# Patient Record
Sex: MALE | Race: WHITE | NOT HISPANIC OR LATINO | Employment: UNEMPLOYED | URBAN - METROPOLITAN AREA
[De-identification: names, ages, dates, MRNs, and addresses within clinical notes are randomized per-mention and may not be internally consistent; named-entity substitution may affect disease eponyms.]

---

## 2024-11-18 ENCOUNTER — OFFICE VISIT (OUTPATIENT)
Dept: URGENT CARE | Facility: CLINIC | Age: 44
End: 2024-11-18

## 2024-11-18 VITALS
DIASTOLIC BLOOD PRESSURE: 84 MMHG | SYSTOLIC BLOOD PRESSURE: 140 MMHG | WEIGHT: 240 LBS | HEIGHT: 72 IN | BODY MASS INDEX: 32.51 KG/M2 | RESPIRATION RATE: 18 BRPM | HEART RATE: 102 BPM | TEMPERATURE: 98 F

## 2024-11-18 DIAGNOSIS — S50.02XA CONTUSION OF LEFT ELBOW, INITIAL ENCOUNTER: ICD-10-CM

## 2024-11-18 DIAGNOSIS — T14.8XXA ABRASION: ICD-10-CM

## 2024-11-18 DIAGNOSIS — S80.02XA CONTUSION OF LEFT KNEE, INITIAL ENCOUNTER: ICD-10-CM

## 2024-11-18 DIAGNOSIS — M25.522 LEFT ELBOW PAIN: Primary | ICD-10-CM

## 2024-11-18 PROCEDURE — 99203 OFFICE O/P NEW LOW 30 MIN: CPT | Mod: TIER,S$GLB,, | Performed by: NURSE PRACTITIONER

## 2024-11-18 NOTE — PROGRESS NOTES
Subjective:       Patient ID: Des Gonzalez is a 44 y.o. male.    Vitals:  height is 6' (1.829 m) and weight is 108.9 kg (240 lb). His oral temperature is 97.9 °F (36.6 °C). His blood pressure is 140/84 (abnormal) and his pulse is 102. His respiration is 18.     Chief Complaint: Elbow Injury    44 y.o. male who presents today with a chief complaint of left elbow pain. He explains that he tripped and fell (ground level fall) last night at 9pm. He also reports a small abrasion to left eyebrow area and anterior left knee. He denies any LOC. He denies any neck pain. He denies any other symptoms or complaints other than previously stated.    Elbow Injury  This is a new problem. The current episode started yesterday. The problem occurs constantly. The problem has been gradually worsening. Exacerbated by: movement. He has tried nothing for the symptoms.       Musculoskeletal:  Positive for pain.   Skin:  Positive for abrasion.           Objective:      Physical Exam   Constitutional: He is oriented to person, place, and time.  Non-toxic appearance. He does not appear ill. No distress. obesity  HENT:   Head: Normocephalic.      Comments: Small abrasion to left eyebrow area.  Ears:   Right Ear: Tympanic membrane, external ear and ear canal normal.   Left Ear: Tympanic membrane, external ear and ear canal normal.   Nose: Nose normal.   Mouth/Throat: Mucous membranes are moist. Oropharynx is clear.   Eyes: Conjunctivae are normal. Pupils are equal, round, and reactive to light. Extraocular movement intact   Neck: Neck supple. No neck rigidity present.   Cardiovascular: Normal rate, regular rhythm, normal heart sounds and normal pulses.   Pulmonary/Chest: Effort normal and breath sounds normal.   Abdominal: Normal appearance.   Musculoskeletal: Normal range of motion.         General: No swelling or tenderness. Normal range of motion.      Cervical back: He exhibits no tenderness.      Comments: + Pain to left elbow with  flexion/extension.   Neurological: no focal deficit. He is alert and oriented to person, place, and time. He displays no weakness. No sensory deficit. Gait normal.   Skin: Skin is warm, dry and not diaphoretic.         Comments: Small abrasion to anterior left knee.   Psychiatric: His behavior is normal. Mood, judgment and thought content normal.   Vitals reviewed.        Past medical history and current medications reviewed.     No results found for this or any previous visit. XR ELBOW 2 VIEWS LEFT    Result Date: 11/18/2024  EXAMINATION: XR ELBOW 2 VIEWS LEFT CLINICAL HISTORY: Pain in left elbow TECHNIQUE: AP lateral views left elbow. COMPARISON: None FINDINGS: There is a small joint effusion with fullness of the anterior fat pad and partial visualization of the posterior fat pad.  This is suspicious for an occult fracture of the radial head.  No linear lucency identified within the radial head to suggest fracture location. Joint spaces are preserved.  Proximal ulna and distal humerus intact     Small joint effusion suspicious for an occult fracture of the radial head.  Repeat imaging in 2-3 days as deemed clinically necessary to confirm this finding. This report was flagged in Epic as abnormal. Electronically signed by: Erasmo Schmid Date:    11/18/2024 Time:    10:19     ORTHOGLASS SPLINT/SHOULDER SLING  Posterior Elbow splint placed to the left arm. Patient had good TNV before and after application. A shoulder sling was also placed. Patient was given sling/splint instructions. He verbalized understanding of same.  Assessment:           1. Left elbow pain    2. Contusion of left elbow, initial encounter    3. Abrasion    4. Contusion of left knee, initial encounter              Plan:         Left elbow pain  -     XR ELBOW 2 VIEWS LEFT; Future; Expected date: 11/18/2024    Contusion of left elbow, initial encounter  -     Ambulatory referral/consult to Orthopedics    Abrasion  -     Ambulatory referral/consult  to Orthopedics    Contusion of left knee, initial encounter           INSTRUCTIONS  Rest, Ice, Elevate. Ibuprofen for pain. Follow up with orthopedics as scheduled.

## 2025-03-13 ENCOUNTER — OFFICE VISIT (OUTPATIENT)
Dept: URGENT CARE | Facility: CLINIC | Age: 45
End: 2025-03-13
Payer: COMMERCIAL

## 2025-03-13 ENCOUNTER — HOSPITAL ENCOUNTER (EMERGENCY)
Facility: HOSPITAL | Age: 45
Discharge: ANOTHER HOSPITAL | End: 2025-03-14
Attending: EMERGENCY MEDICINE
Payer: COMMERCIAL

## 2025-03-13 VITALS
HEIGHT: 72 IN | SYSTOLIC BLOOD PRESSURE: 131 MMHG | OXYGEN SATURATION: 99 % | TEMPERATURE: 98 F | DIASTOLIC BLOOD PRESSURE: 92 MMHG | RESPIRATION RATE: 19 BRPM | WEIGHT: 240 LBS | BODY MASS INDEX: 32.51 KG/M2 | HEART RATE: 57 BPM

## 2025-03-13 DIAGNOSIS — R07.9 CHEST PAIN, UNSPECIFIED TYPE: Primary | ICD-10-CM

## 2025-03-13 DIAGNOSIS — I21.4 NSTEMI (NON-ST ELEVATED MYOCARDIAL INFARCTION): Primary | ICD-10-CM

## 2025-03-13 DIAGNOSIS — R61 DIAPHORESIS: ICD-10-CM

## 2025-03-13 DIAGNOSIS — R09.81 NASAL CONGESTION: ICD-10-CM

## 2025-03-13 DIAGNOSIS — R07.9 CHEST PAIN: ICD-10-CM

## 2025-03-13 DIAGNOSIS — R05.9 COUGH, UNSPECIFIED TYPE: ICD-10-CM

## 2025-03-13 LAB
ALBUMIN SERPL BCP-MCNC: 4 G/DL (ref 3.5–5.2)
ALP SERPL-CCNC: 71 U/L (ref 40–150)
ALT SERPL W/O P-5'-P-CCNC: 58 U/L (ref 10–44)
ANION GAP SERPL CALC-SCNC: 12 MMOL/L (ref 8–16)
APTT PPP: 27 SEC (ref 21–32)
AST SERPL-CCNC: 59 U/L (ref 10–40)
BACTERIA #/AREA URNS HPF: ABNORMAL /HPF
BASOPHILS # BLD AUTO: 0.03 K/UL (ref 0–0.2)
BASOPHILS NFR BLD: 0.3 % (ref 0–1.9)
BILIRUB SERPL-MCNC: 1.8 MG/DL (ref 0.1–1)
BILIRUB UR QL STRIP: ABNORMAL
BNP SERPL-MCNC: 21 PG/ML (ref 0–99)
BUN SERPL-MCNC: 12 MG/DL (ref 6–20)
CALCIUM SERPL-MCNC: 9.4 MG/DL (ref 8.7–10.5)
CHLORIDE SERPL-SCNC: 97 MMOL/L (ref 95–110)
CLARITY UR: ABNORMAL
CO2 SERPL-SCNC: 25 MMOL/L (ref 23–29)
COLOR UR: YELLOW
CREAT SERPL-MCNC: 1 MG/DL (ref 0.5–1.4)
CTP QC/QA: YES
CTP QC/QA: YES
DIFFERENTIAL METHOD BLD: ABNORMAL
EOSINOPHIL # BLD AUTO: 0.1 K/UL (ref 0–0.5)
EOSINOPHIL NFR BLD: 0.7 % (ref 0–8)
ERYTHROCYTE [DISTWIDTH] IN BLOOD BY AUTOMATED COUNT: 12.5 % (ref 11.5–14.5)
EST. GFR  (NO RACE VARIABLE): >60 ML/MIN/1.73 M^2
GLUCOSE SERPL-MCNC: 108 MG/DL (ref 70–110)
GLUCOSE UR QL STRIP: NEGATIVE
HCT VFR BLD AUTO: 47.7 % (ref 40–54)
HGB BLD-MCNC: 17.2 G/DL (ref 14–18)
HGB UR QL STRIP: NEGATIVE
HYALINE CASTS #/AREA URNS LPF: 0 /LPF
IMM GRANULOCYTES # BLD AUTO: 0.03 K/UL (ref 0–0.04)
IMM GRANULOCYTES NFR BLD AUTO: 0.3 % (ref 0–0.5)
INR PPP: 1 (ref 0.8–1.2)
INR PPP: 1.1 (ref 0.8–1.2)
INR PPP: 1.1 (ref 0.8–1.2)
KETONES UR QL STRIP: ABNORMAL
LEUKOCYTE ESTERASE UR QL STRIP: NEGATIVE
LYMPHOCYTES # BLD AUTO: 1.4 K/UL (ref 1–4.8)
LYMPHOCYTES NFR BLD: 14.4 % (ref 18–48)
MCH RBC QN AUTO: 30.7 PG (ref 27–31)
MCHC RBC AUTO-ENTMCNC: 36.1 G/DL (ref 32–36)
MCV RBC AUTO: 85 FL (ref 82–98)
MICROSCOPIC COMMENT: ABNORMAL
MONOCYTES # BLD AUTO: 1.7 K/UL (ref 0.3–1)
MONOCYTES NFR BLD: 18.4 % (ref 4–15)
NEUTROPHILS # BLD AUTO: 6.2 K/UL (ref 1.8–7.7)
NEUTROPHILS NFR BLD: 65.9 % (ref 38–73)
NITRITE UR QL STRIP: NEGATIVE
NRBC BLD-RTO: 0 /100 WBC
PH UR STRIP: 6 [PH] (ref 5–8)
PLATELET # BLD AUTO: 121 K/UL (ref 150–450)
PMV BLD AUTO: 10.9 FL (ref 9.2–12.9)
POC MOLECULAR INFLUENZA A AGN: NEGATIVE
POC MOLECULAR INFLUENZA B AGN: NEGATIVE
POTASSIUM SERPL-SCNC: 3.3 MMOL/L (ref 3.5–5.1)
PROT SERPL-MCNC: 8.2 G/DL (ref 6–8.4)
PROT UR QL STRIP: ABNORMAL
PROTHROMBIN TIME: 11.5 SEC (ref 9–12.5)
PROTHROMBIN TIME: 11.7 SEC (ref 9–12.5)
PROTHROMBIN TIME: 11.7 SEC (ref 9–12.5)
RBC # BLD AUTO: 5.61 M/UL (ref 4.6–6.2)
RBC #/AREA URNS HPF: 1 /HPF (ref 0–4)
SARS CORONAVIRUS 2 ANTIGEN: NEGATIVE
SODIUM SERPL-SCNC: 134 MMOL/L (ref 136–145)
SP GR UR STRIP: 1.02 (ref 1–1.03)
TROPONIN I SERPL DL<=0.01 NG/ML-MCNC: 5.15 NG/ML (ref 0–0.03)
TROPONIN I SERPL DL<=0.01 NG/ML-MCNC: 7.98 NG/ML (ref 0–0.03)
URN SPEC COLLECT METH UR: ABNORMAL
UROBILINOGEN UR STRIP-ACNC: NEGATIVE EU/DL
WBC # BLD AUTO: 9.39 K/UL (ref 3.9–12.7)
WBC #/AREA URNS HPF: 3 /HPF (ref 0–5)

## 2025-03-13 PROCEDURE — 84484 ASSAY OF TROPONIN QUANT: CPT | Mod: 91 | Performed by: EMERGENCY MEDICINE

## 2025-03-13 PROCEDURE — 83880 ASSAY OF NATRIURETIC PEPTIDE: CPT | Performed by: EMERGENCY MEDICINE

## 2025-03-13 PROCEDURE — 85730 THROMBOPLASTIN TIME PARTIAL: CPT | Performed by: EMERGENCY MEDICINE

## 2025-03-13 PROCEDURE — 85610 PROTHROMBIN TIME: CPT | Performed by: EMERGENCY MEDICINE

## 2025-03-13 PROCEDURE — 25000242 PHARM REV CODE 250 ALT 637 W/ HCPCS: Performed by: EMERGENCY MEDICINE

## 2025-03-13 PROCEDURE — 93010 ELECTROCARDIOGRAM REPORT: CPT | Mod: ,,, | Performed by: INTERNAL MEDICINE

## 2025-03-13 PROCEDURE — 87389 HIV-1 AG W/HIV-1&-2 AB AG IA: CPT | Performed by: EMERGENCY MEDICINE

## 2025-03-13 PROCEDURE — 96374 THER/PROPH/DIAG INJ IV PUSH: CPT

## 2025-03-13 PROCEDURE — 81000 URINALYSIS NONAUTO W/SCOPE: CPT | Performed by: EMERGENCY MEDICINE

## 2025-03-13 PROCEDURE — 71045 X-RAY EXAM CHEST 1 VIEW: CPT | Mod: TC

## 2025-03-13 PROCEDURE — 93005 ELECTROCARDIOGRAM TRACING: CPT | Performed by: INTERNAL MEDICINE

## 2025-03-13 PROCEDURE — 63600175 PHARM REV CODE 636 W HCPCS: Performed by: EMERGENCY MEDICINE

## 2025-03-13 PROCEDURE — 85025 COMPLETE CBC W/AUTO DIFF WBC: CPT | Performed by: EMERGENCY MEDICINE

## 2025-03-13 PROCEDURE — 86803 HEPATITIS C AB TEST: CPT | Performed by: EMERGENCY MEDICINE

## 2025-03-13 PROCEDURE — 25000003 PHARM REV CODE 250: Performed by: EMERGENCY MEDICINE

## 2025-03-13 PROCEDURE — 36415 COLL VENOUS BLD VENIPUNCTURE: CPT | Performed by: EMERGENCY MEDICINE

## 2025-03-13 PROCEDURE — 94761 N-INVAS EAR/PLS OXIMETRY MLT: CPT

## 2025-03-13 PROCEDURE — 80053 COMPREHEN METABOLIC PANEL: CPT | Performed by: EMERGENCY MEDICINE

## 2025-03-13 PROCEDURE — 99291 CRITICAL CARE FIRST HOUR: CPT

## 2025-03-13 PROCEDURE — 71045 X-RAY EXAM CHEST 1 VIEW: CPT | Mod: 26,,, | Performed by: RADIOLOGY

## 2025-03-13 PROCEDURE — 96376 TX/PRO/DX INJ SAME DRUG ADON: CPT

## 2025-03-13 RX ORDER — NITROGLYCERIN 0.4 MG/1
0.4 TABLET SUBLINGUAL
Status: COMPLETED | OUTPATIENT
Start: 2025-03-13 | End: 2025-03-13

## 2025-03-13 RX ORDER — POTASSIUM CHLORIDE 20 MEQ/1
40 TABLET, EXTENDED RELEASE ORAL
Status: COMPLETED | OUTPATIENT
Start: 2025-03-13 | End: 2025-03-13

## 2025-03-13 RX ORDER — LIDOCAINE HYDROCHLORIDE 20 MG/ML
15 SOLUTION OROPHARYNGEAL ONCE
Status: COMPLETED | OUTPATIENT
Start: 2025-03-13 | End: 2025-03-13

## 2025-03-13 RX ORDER — HEPARIN SODIUM,PORCINE/D5W 25000/250
0-40 INTRAVENOUS SOLUTION INTRAVENOUS CONTINUOUS
Status: DISCONTINUED | OUTPATIENT
Start: 2025-03-13 | End: 2025-03-14 | Stop reason: HOSPADM

## 2025-03-13 RX ORDER — ALUMINUM HYDROXIDE, MAGNESIUM HYDROXIDE, AND SIMETHICONE 1200; 120; 1200 MG/30ML; MG/30ML; MG/30ML
30 SUSPENSION ORAL ONCE
Status: COMPLETED | OUTPATIENT
Start: 2025-03-13 | End: 2025-03-13

## 2025-03-13 RX ORDER — ASPIRIN 325 MG
325 TABLET ORAL
Status: COMPLETED | OUTPATIENT
Start: 2025-03-13 | End: 2025-03-13

## 2025-03-13 RX ORDER — MORPHINE SULFATE 2 MG/ML
4 INJECTION, SOLUTION INTRAMUSCULAR; INTRAVENOUS
Refills: 0 | Status: COMPLETED | OUTPATIENT
Start: 2025-03-13 | End: 2025-03-13

## 2025-03-13 RX ADMIN — HEPARIN SODIUM 12 UNITS/KG/HR: 10000 INJECTION, SOLUTION INTRAVENOUS at 04:03

## 2025-03-13 RX ADMIN — LIDOCAINE HYDROCHLORIDE 15 ML: 20 SOLUTION ORAL at 02:03

## 2025-03-13 RX ADMIN — ASPIRIN 325 MG ORAL TABLET 325 MG: 325 PILL ORAL at 06:03

## 2025-03-13 RX ADMIN — POTASSIUM CHLORIDE 40 MEQ: 1500 TABLET, EXTENDED RELEASE ORAL at 06:03

## 2025-03-13 RX ADMIN — ALUMINUM HYDROXIDE, MAGNESIUM HYDROXIDE, AND DIMETHICONE 30 ML: 200; 20; 200 SUSPENSION ORAL at 02:03

## 2025-03-13 RX ADMIN — MORPHINE SULFATE 4 MG: 2 INJECTION, SOLUTION INTRAMUSCULAR; INTRAVENOUS at 07:03

## 2025-03-13 RX ADMIN — NITROGLYCERIN 0.4 MG: 0.4 TABLET SUBLINGUAL at 02:03

## 2025-03-13 NOTE — PATIENT INSTRUCTIONS
Advised pt to go to ER for further evaluation and treatment. Pt declined EMS transport stating wife will bring.

## 2025-03-13 NOTE — ED NOTES
Report received from RUTHY Chan.  Pt in bed at this time.  Updated on current plan of care.  Pt agreeable to plan.  NAD noted.  No needs voiced at this time.

## 2025-03-13 NOTE — ED NOTES
"Initial contact and introduction made. Pt arrived via POV with wife. Wife states "We were at urgent care and his EKG was abnormal, they told us that we can go to the ER ambulance or you can drive him over". Pt c/o midline chest pain and lower right chest wall. Pt states "I feel about the same, I feel better now that I know my EKG wasn't abnormal". Pt in supine position, HOB 30. Wife at bedside. No noted distress.   "

## 2025-03-13 NOTE — ED NOTES
First contact with pt. Pt sitting up in bed, lying supine. Pt informed of transfer states. Pt denies chest pain at this time. VSS. NSR. IV saline locked. Care ongoing.

## 2025-03-13 NOTE — ED NOTES
Second Troponin resulted @ 7.96 ng/ml elevated from initial 5.146 ng/ml. ERP [facility] aware.  Chuck Darden  RN Charge notified. No further orders at this time .

## 2025-03-13 NOTE — ED PROVIDER NOTES
Encounter Date: 3/13/2025       History     Chief Complaint   Patient presents with    Chest Pain     Patient states substernal chest pain that started about one hour ago.  Patient states pain is tightness, 7/10, non radiating.       44-year-old male here from home via private vehicle complaining of aching substernal chest pain which does not radiate.  Denies any shortness of breath.  Denies diaphoresis.  No nausea or vomiting at this time.  Patient denies any cardiac history.  Triage EKG normal.  Symptoms started a proximally 1 hour prior to arrival.  He took some [place] at home without relief.  Patient states he has been feeling ill with viral upper respiratory tract symptoms for past several days.      Review of patient's allergies indicates:  No Known Allergies  History reviewed. No pertinent past medical history.  Past Surgical History:   Procedure Laterality Date    WRIST SURGERY Left      No family history on file.  Social History[1]  Review of Systems   Constitutional:  Negative for chills, fatigue and fever.   HENT:  Positive for congestion and rhinorrhea. Negative for sore throat.    Cardiovascular:  Positive for chest pain.   Gastrointestinal:  Negative for abdominal pain.   All other systems reviewed and are negative.      Physical Exam     Initial Vitals [03/13/25 1321]   BP Pulse Resp Temp SpO2   122/76 73 18 97.8 °F (36.6 °C) 98 %      MAP       --         Physical Exam    Nursing note and vitals reviewed.  Constitutional: He appears well-developed and well-nourished. He is not diaphoretic. No distress.   HENT:   Head: Normocephalic and atraumatic.   Nose: Nose normal. Mouth/Throat: Oropharynx is clear and moist. No oropharyngeal exudate.   Eyes: Conjunctivae and EOM are normal. Pupils are equal, round, and reactive to light. No scleral icterus.   Neck: Neck supple. No JVD present.   Normal range of motion.  Cardiovascular:  Normal rate, regular rhythm, normal heart sounds and intact distal  pulses.           No murmur heard.  Pulmonary/Chest: Breath sounds normal. No stridor. No respiratory distress. He has no wheezes. He has no rhonchi. He has no rales. He exhibits no tenderness.   Abdominal: Abdomen is soft. Bowel sounds are normal. He exhibits no distension. There is no abdominal tenderness.   Musculoskeletal:         General: No tenderness or edema. Normal range of motion.      Cervical back: Normal range of motion and neck supple.     Neurological: He is alert and oriented to person, place, and time. He has normal strength. No cranial nerve deficit or sensory deficit. GCS score is 15. GCS eye subscore is 4. GCS verbal subscore is 5. GCS motor subscore is 6.   Skin: Skin is warm and dry. Capillary refill takes less than 2 seconds. No rash noted. No erythema.   Psychiatric: He has a normal mood and affect. Thought content normal.         ED Course   Critical Care    Date/Time: 3/13/2025 4:04 PM    Performed by: Herbie Ely MD  Authorized by: Herbie Ely MD  Direct patient critical care time: 31 minutes  Additional history critical care time: 5 minutes  Ordering / reviewing critical care time: 4 minutes  Documentation critical care time: 17 minutes  Consulting other physicians critical care time: 8 minutes  Total critical care time (exclusive of procedural time) : 65 minutes  Critical care time was exclusive of separately billable procedures and treating other patients and teaching time.  Critical care was necessary to treat or prevent imminent or life-threatening deterioration of the following conditions: cardiac failure.  Critical care was time spent personally by me on the following activities: development of treatment plan with patient or surrogate, discussions with consultants, interpretation of cardiac output measurements, evaluation of patient's response to treatment, examination of patient, obtaining history from patient or surrogate, ordering and performing treatments and  interventions, ordering and review of laboratory studies, ordering and review of radiographic studies, pulse oximetry, re-evaluation of patient's condition and review of old charts.        Labs Reviewed   CBC W/ AUTO DIFFERENTIAL - Abnormal       Result Value    WBC 9.39      RBC 5.61      Hemoglobin 17.2      Hematocrit 47.7      MCV 85      MCH 30.7      MCHC 36.1 (*)     RDW 12.5      Platelets 121 (*)     MPV 10.9      Immature Granulocytes 0.3      Gran # (ANC) 6.2      Immature Grans (Abs) 0.03      Lymph # 1.4      Mono # 1.7 (*)     Eos # 0.1      Baso # 0.03      nRBC 0      Gran % 65.9      Lymph % 14.4 (*)     Mono % 18.4 (*)     Eosinophil % 0.7      Basophil % 0.3      Differential Method Automated      Narrative:     Release to patient->Immediate   COMPREHENSIVE METABOLIC PANEL - Abnormal    Sodium 134 (*)     Potassium 3.3 (*)     Chloride 97      CO2 25      Glucose 108      BUN 12      Creatinine 1.0      Calcium 9.4      Total Protein 8.2      Albumin 4.0      Total Bilirubin 1.8 (*)     Alkaline Phosphatase 71      AST 59 (*)     ALT 58 (*)     eGFR >60.0      Anion Gap 12      Narrative:     Release to patient->Immediate   TROPONIN I - Abnormal    Troponin I 5.146 (*)     Narrative:     Release to patient->Immediate   TROPONIN I - Abnormal    Troponin I 7.976 (*)    URINALYSIS, REFLEX TO URINE CULTURE - Abnormal    Specimen UA Urine, Unspecified      Color, UA Yellow      Appearance, UA Hazy (*)     pH, UA 6.0      Specific Gravity, UA 1.025      Protein, UA 1+ (*)     Glucose, UA Negative      Ketones, UA 1+ (*)     Bilirubin (UA) 1+ (*)     Occult Blood UA Negative      Nitrite, UA Negative      Urobilinogen, UA Negative      Leukocytes, UA Negative      Narrative:     Preferred Collection Type->Urine, Clean Catch  Specimen Source->Urine   URINALYSIS MICROSCOPIC - Abnormal    RBC, UA 1      WBC, UA 3      Bacteria Few (*)     Hyaline Casts, UA 0      Microscopic Comment SEE COMMENT       Narrative:     Preferred Collection Type->Urine, Clean Catch  Specimen Source->Urine   TROPONIN I - Abnormal    Troponin I 8.028 (*)    CBC W/ AUTO DIFFERENTIAL - Abnormal    WBC 9.97      RBC 5.43      Hemoglobin 16.6      Hematocrit 45.7      MCV 84      MCH 30.6      MCHC 36.3 (*)     RDW 12.6      Platelets 150      MPV 9.4      Immature Granulocytes 0.2      Gran # (ANC) 6.8      Immature Grans (Abs) 0.02      Lymph # 1.8      Mono # 1.4 (*)     Eos # 0.0      Baso # 0.02      nRBC 0      Gran % 67.7      Lymph % 17.8 (*)     Mono % 13.8      Eosinophil % 0.3      Basophil % 0.2      Differential Method Automated     HEPATITIS C ANTIBODY    Hepatitis C Ab Non-reactive      Narrative:     Release to patient->Immediate   HIV 1 / 2 ANTIBODY    HIV 1/2 Ag/Ab Non-reactive      Narrative:     Release to patient->Immediate   B-TYPE NATRIURETIC PEPTIDE    BNP 21      Narrative:     Release to patient->Immediate   PROTIME-INR    Prothrombin Time 11.5      INR 1.0      Narrative:     Release to patient->Immediate   APTT    aPTT 27.0      Narrative:     PTT daily if no changes in infusion rate  (if patient is on warfarin prior to heparin therapy)   PROTIME-INR    Prothrombin Time 11.7      INR 1.1      Narrative:     PTT daily if no changes in infusion rate  (if patient is on warfarin prior to heparin therapy)   PROTIME-INR    Prothrombin Time 11.7      INR 1.1     APTT    aPTT 27.3       EKG Readings: (Independently Interpreted)   EKG personally reviewed by me shows normal sinus rhythm, sinus arrhythmia, 60 beats per minute, DC interval 178, .  No ST elevation or depression, no T-wave change, no arrhythmia.    EKG 2., done about 2.5 hours after the initial, again shows sinus rhythm with sinus arrhythmia, no ST elevation or depression or T-wave change, 61 beats per minute, DC interval 180, .  No arrhythmia.     ECG Results              EKG 12-lead (Final result)        Collection Time Result Time QRS  Duration [facility] QTC Calculation    03/14/25 01:17:39 03/14/25 09:14:12 108 448                     Final result by Interface, Lab In [facility] [place] (03/14/25 09:14:16)                   Narrative:    Test Reason :    Vent. Rate :  67 BPM     Atrial Rate :  67 BPM     P-R Int : 180 ms          QRS Dur : 108 ms      QT Int : 424 ms       P-R-T Axes :  41 -18  13 degrees    QTcB Int : 448 ms    Normal sinus rhythm  Inferior infarct (cited on or before 14-Mar-2025)  Cannot rule out Anterior infarct ,age undetermined  Abnormal ECG  When compared with ECG of 14-Mar-2025 00:15,  No significant change was found  Confirmed by Sanjiv Patel ([id number]) on 3/14/2025 9:14:07 AM    Referred By: AAAREFERRAL SELF           Confirmed By: Sanjiv Patel                                     EKG 12-lead (Final result)        Collection Time Result Time QRS Duration [facility] QTC Calculation    03/14/25 00:15:33 03/14/25 09:13:49 108 410                     Final result by Interface, Lab In [facility] [place] (03/14/25 09:13:55)                   Narrative:    Test Reason :    Vent. Rate :  57 BPM     Atrial Rate :  57 BPM     P-R Int : 146 ms          QRS Dur : 108 ms      QT Int : 422 ms       P-R-T Axes :  50  -9  21 degrees    QTcB Int : 410 ms    Sinus bradycardia  Inferior infarct ,age undetermined  Abnormal ECG  When compared with ECG of 13-Mar-2025 18:57,  No significant change was found  Confirmed by Sanjiv Patel (56) on 3/14/2025 9:13:48 AM    Referred By: AAAREFERRAL SELF           Confirmed By: Sanjiv Patel                                     EKG 12-lead (Final result)        Collection Time Result Time QRS Duration [facility] QTC Calculation    03/13/25 18:57:30 03/14/25 09:11:56 108 457                     Final result by Interface, Lab In [facility] [place] (03/14/25 09:12:03)                   Narrative:    Test Reason :    Vent. Rate :  72 BPM     Atrial Rate :  72 BPM     P-R Int : 188 ms          QRS Dur : 108 ms      QT Int : 418 ms       P-R-T Axes :  54 -13  22 degrees    QTcB  Int : 457 ms    Normal sinus rhythm  Cannot rule out Anterior infarct ,age undetermined  Abnormal ECG  When compared with ECG of 13-Mar-2025 15:42,  No significant change was found  Confirmed by Sanjiv Patel (56) on 3/14/2025 9:11:53 AM    Referred By: CHANTALERRAL SELF           Confirmed By: Sanjiv Patel                                     EKG 12-lead (Final result)        Collection Time Result Time QRS Duration [facility] QTC Calculation    03/13/25 15:42:37 03/14/25 09:11:50 112 426                     Final result by Interface, Lab In [facility] (03/14/25 09:11:52)                   Narrative:    Test Reason :    Vent. Rate :  61 BPM     Atrial Rate :  61 BPM     P-R Int : 180 ms          QRS Dur : 112 ms      QT Int : 424 ms       P-R-T Axes :  59  10  29 degrees    QTcB Int : 426 ms    Sinus rhythm with marked sinus arrythmia  Otherwise normal ECG  When compared with ECG of 13-Mar-2025 13:19,  No significant change was found  Confirmed by Sanjiv Patel (56) on 3/14/2025 9:11:49 AM    Referred By: CORYREFERRAL SELF           Confirmed By: Sanjiv Patel                                     EKG 12-lead (Final result)        Collection Time Result Time QRS Duration [facility] QTC Calculation    03/13/25 13:19:09 03/14/25 09:09:22 116 428                     Final result by Interface, Lab In [facility] (03/14/25 09:09:26)                   Narrative:    Test Reason : R07.9,    Vent. Rate :  60 BPM     Atrial Rate :  60 BPM     P-R Int : 178 ms          QRS Dur : 116 ms      QT Int : 428 ms       P-R-T Axes :  55   0  33 degrees    QTcB Int : 428 ms    Normal sinus rhythm with sinus arrhythmia  Normal ECG  When compared with ECG of 13-Mar-2025 12:26,  NH interval has increased  Incomplete right bundle branch block is no longer Present  Confirmed by Sanjiv Patel (56) on 3/14/2025 9:09:18 AM    Referred By: CHANTALERRAL SELF           Confirmed By: Sanjiv Patel                                     EKG 12-lead (Final result)        Collection Time Result  Time QRS Duration [facility] QTC Calculation    03/13/25 12:26:46 03/14/25 09:09:31 116 413                     Final result by Interface, Lab In [facility] (03/14/25 09:09:37)                   Narrative:    Test Reason : R07.9,    Vent. Rate :  56 BPM     Atrial Rate :  56 BPM     P-R Int :  88 ms          QRS Dur : 116 ms      QT Int : 428 ms       P-R-T Axes :  71  -7   6 degrees    QTcB Int : 413 ms    Sinus bradycardia with short [place]  Incomplete right bundle branch block  Inferior infarct ,age undetermined  Abnormal ECG  No previous ECGs available  Confirmed by Sanjiv Patel ([id number]) on 3/14/2025 9:09:30 AM    Referred By: AAAREFERRAL SELF           Confirmed By: Sanjiv Patel                                  Imaging Results              X-Ray Chest AP Portable (Final result)  Result time 03/13/25 13:43:47      Final result by Elda Ugarte MD (03/13/25 13:43:47)                   Impression:      No acute abnormality.      Electronically signed by: Elda Ugarte  Date:    03/13/2025  Time:    13:43               Narrative:    EXAMINATION:  XR CHEST AP PORTABLE    CLINICAL HISTORY:  Chest Pain;    TECHNIQUE:  Single frontal view of the chest was performed.    COMPARISON:  None    FINDINGS:  The lungs are clear, with normal appearance of pulmonary vasculature and no pleural effusion or pneumothorax.    The cardiac silhouette is normal in size. The hilar and mediastinal contours are unremarkable.    Bones are intact.                                    X-Rays:   Independently Interpreted Readings:   Other Readings:  Chest x-ray personally reviewed by me shows clear lungs bilaterally.  Normal cardiac silhouette, normal skeletal structures.    Medications   nitroGLYCERIN SL tablet 0.4 mg (0.4 mg Sublingual Given 3/13/25 1403)   aluminum-magnesium hydroxide-simethicone 200-200-20 mg/5 mL suspension 30 mL (30 mLs Oral Given 3/13/25 9648)     And   LIDOcaine viscous HCl 2% oral solution 15 mL (15 mLs Oral Given 3/13/25 5618)    heparin 25,000 units in dextrose 5% (100 units/ml) IV bolus from bag LOW INTENSITY nomogram - [facility] (3,240 Units Intravenous Bolus from Bag 3/13/25 1615)   aspirin tablet 325 mg (325 mg Oral Given 3/13/25 1843)   potassium chloride SA CR tablet 40 mEq (40 mEq Oral Given 3/13/25 1843)   morphine injection 4 mg (4 mg Intravenous Given 3/13/25 1909)   nitroGLYCERIN 2% TD oint ointment 0.5 inch (0.5 inches Topical (Top) Given 3/14/25 0030)   aluminum-magnesium hydroxide-simethicone 200-200-20 mg/5 mL suspension 30 mL (30 mLs Oral Given 3/14/25 0034)     And   LIDOcaine viscous HCl 2% oral solution 15 mL (15 mLs Oral Given 3/14/25 0034)   morphine injection 4 mg (4 mg Intravenous Given 3/14/25 0114)     Medical Decision Making  Differential includes GERD, gastritis, myocardial infarction, aneurysm, pneumonia, pleurisy, costochondritis, etc.    EKG x2 showed normal sinus rhythm with sinus arrhythmia, no ST elevations or depressions or T-wave changes.  His initial troponin, however, was 5.146.  Over the course of his stay here, the patient was given medications for nausea, and was also given nitroglycerin.  The nitroglycerin did not have any effect on his discomfort.  He was then given a GI cocktail which totally relieved his pain.  He states his pain level now is 0/10.  Second troponin will be drawn shortly.  Patient has been placed on a heparin drip.  Because of his significantly elevated troponin in the face of chest pain, patient will need cardiology evaluation and possible cath lab treatment.  Unfortunately this is not available at this facility so patient will need transfer.  He understands and agrees.  He would rather [place] if available, but also states he would go to [facility] or anywhere in the [facility].    Amount and/or Complexity of Data Reviewed  Labs: ordered.  Radiology: ordered.    Risk  OTC drugs.  Prescription drug management.               ED Course as of 03/17/25 0145   Fri Mar 14, 2025   0117  Discussing over the past several hours with transfer center attempts to get patient placed at whatever hospital can take + [ND]      ED Course User Index  [ND] Ben Gillette MD                   Patient accepted for transfer for NSTEMI vs myocarditis.     See Patient Flow Center encounter for facility timing and details.          Clinical Impression:  Final diagnoses:  [R07.9] Chest pain  [I21.4] NSTEMI (non-ST elevated myocardial infarction) (Primary)          ED Disposition Condition    Transfer to Another Facility Stable                    Ben Gillette MD  03/13/25 2041       [1]   Social History  Tobacco Use    Smoking status: Every Day     Types: Cigarettes, Vaping with nicotine     Passive exposure: Current    Smokeless tobacco: Never   Substance Use Topics    Alcohol use: Yes    Drug use: Never        Ben Gillette MD  03/17/25 5159

## 2025-03-13 NOTE — PROGRESS NOTES
Subjective:       Patient ID: Des Gonzalez is a 44 y.o. male.    Vitals:  height is 6' (1.829 m) and weight is 108.9 kg (240 lb). His oral temperature is 98.1 °F (36.7 °C). His blood pressure is 131/92 (abnormal) and his pulse is 57 (abnormal). His respiration is 19 and oxygen saturation is 99%.     Chief Complaint: Chest Pain    This is a 44 y.o. male who presents today with a chief complaint of chest pain in the center of his chest for the last 30 minutes.  He was laying in bed watching tv at the time. Over the last week he has also had coughing, fever and diarrhea.  He has been taking metamucil (today), dalia seltzer daytime cold and flu, nyquil .  He woke this morning drenched in sweat. Pt states that the pain is like a sitting pt states that pain is constant.   Patient presents with:  Chest Pain         Chest Pain   This is a new problem. The current episode started today. The problem has been gradually worsening. The pain is present in the substernal region. The pain is at a severity of 6/10. The pain is moderate. Quality: aching. The pain does not radiate. Associated symptoms include a cough, a fever and headaches. Treatments tried: dalia seltzer daytime cold and flu , nyquil and metamucil. The treatment provided no relief.       Constitution: Positive for fatigue and fever.   HENT:  Positive for congestion, postnasal drip, sinus pain and sinus pressure.    Neck: neck negative.   Cardiovascular:  Positive for chest pain.   Eyes: Negative.    Respiratory:  Positive for cough.    Gastrointestinal: Negative.    Endocrine: negative.   Genitourinary: Negative.    Musculoskeletal: Negative.    Skin: Negative.    Allergic/Immunologic: Negative.    Neurological:  Positive for headaches.   Hematologic/Lymphatic: Negative.    Psychiatric/Behavioral: Negative.             Objective:      Physical Exam   Constitutional: He is oriented to person, place, and time. He is cooperative. He appears ill. No distress.   HENT:    Head: Normocephalic and atraumatic.   Ears:   Right Ear: Tympanic membrane, external ear and ear canal normal.   Left Ear: Tympanic membrane, external ear and ear canal normal.   Nose: Congestion present. Right sinus exhibits maxillary sinus tenderness and frontal sinus tenderness. Left sinus exhibits maxillary sinus tenderness and frontal sinus tenderness.   Mouth/Throat: Mucous membranes are moist. Posterior oropharyngeal erythema present.   Eyes: Conjunctivae are normal. Pupils are equal, round, and reactive to light. Extraocular movement intact   Neck: Neck supple. No neck rigidity present.   Cardiovascular: Regular rhythm, normal heart sounds and normal pulses. Bradycardia present.   Pulmonary/Chest: Effort normal and breath sounds normal. No respiratory distress. He has no wheezes.   Abdominal: Normal appearance.   Musculoskeletal: Normal range of motion.         General: Normal range of motion.      Cervical back: He exhibits no tenderness.   Neurological: no focal deficit. He is alert, oriented to person, place, and time and at baseline.   Skin: Skin is warm, diaphoretic and pale.   Psychiatric: His behavior is normal. Mood, judgment and thought content normal.   Nursing note and vitals reviewed.        Past medical history and current medications reviewed.       Assessment:           1. Chest pain, unspecified type    2. Nasal congestion    3. Cough, unspecified type              Plan:         Chest pain, unspecified type  -     EKG 12-lead; Future  -     POCT Influenza A/B MOLECULAR  -     SARS Coronavirus 2 Antigen, POCT Manual Read  -     XR CHEST PA AND LATERAL; Future; Expected date: 03/13/2025    Nasal congestion  -     POCT Influenza A/B MOLECULAR  -     SARS Coronavirus 2 Antigen, POCT Manual Read  -     XR CHEST PA AND LATERAL; Future; Expected date: 03/13/2025    Cough, unspecified type  -     POCT Influenza A/B MOLECULAR  -     SARS Coronavirus 2 Antigen, POCT Manual Read  -     XR CHEST PA AND  LATERAL; Future; Expected date: 03/13/2025             Patient Instructions   Please return here or go to the Emergency Department for any concerns or worsening of condition.  Please drink plenty of fluids.  Please get plenty of rest.  If you were prescribed antibiotics, please take them to completion.  If you were given wait & see antibiotics, please wait 5-7 days before taking them, and only take them if your symptoms have worsened or not improved.  If you do begin taking the antibiotics, please take them to completion.  If you were given a steroid shot in the clinic and have also been given a prescription for a steroid such as Prednisone or a Medrol Dose Pack, please begin taking them tomorrow.  If you do not have Hypertension or any history of palpitations, it is ok to take over the counter Sudafed or Mucinex D or Allegra-D or Claritin-D or Zyrtec-D.  If you do take one of the above, it is ok to combine that with plain over the counter Mucinex or Allegra or Claritin or Zyrtec.  If for example you are taking Zyrtec -D, you can combine that with Mucinex, but not Mucinex-D.  If you are taking Mucinex-D, you can combine that with plain Allegra or Claritin or Zyrtec.   If you do have Hypertension or palpitations, it is safe to take Coricidin HBP for relief of sinus symptoms.  If not allergic, please take over the counter Tylenol (Acetaminophen) and/or Motrin (Ibuprofen) as directed for control of pain and/or fever.  Please follow up with your primary care doctor or specialist as needed.    If you  smoke, please stop smoking.

## 2025-03-14 VITALS
DIASTOLIC BLOOD PRESSURE: 95 MMHG | BODY MASS INDEX: 33.86 KG/M2 | RESPIRATION RATE: 20 BRPM | WEIGHT: 250 LBS | HEART RATE: 84 BPM | HEIGHT: 72 IN | OXYGEN SATURATION: 96 % | TEMPERATURE: 98 F | SYSTOLIC BLOOD PRESSURE: 129 MMHG

## 2025-03-14 LAB
APTT PPP: 27.3 SEC (ref 21–32)
BASOPHILS # BLD AUTO: 0.02 K/UL (ref 0–0.2)
BASOPHILS NFR BLD: 0.2 % (ref 0–1.9)
DIFFERENTIAL METHOD BLD: ABNORMAL
EOSINOPHIL # BLD AUTO: 0 K/UL (ref 0–0.5)
EOSINOPHIL NFR BLD: 0.3 % (ref 0–8)
ERYTHROCYTE [DISTWIDTH] IN BLOOD BY AUTOMATED COUNT: 12.6 % (ref 11.5–14.5)
HCT VFR BLD AUTO: 45.7 % (ref 40–54)
HCV AB SERPL QL IA: NORMAL
HGB BLD-MCNC: 16.6 G/DL (ref 14–18)
HIV 1+2 AB+HIV1 P24 AG SERPL QL IA: NORMAL
IMM GRANULOCYTES # BLD AUTO: 0.02 K/UL (ref 0–0.04)
IMM GRANULOCYTES NFR BLD AUTO: 0.2 % (ref 0–0.5)
LYMPHOCYTES # BLD AUTO: 1.8 K/UL (ref 1–4.8)
LYMPHOCYTES NFR BLD: 17.8 % (ref 18–48)
MCH RBC QN AUTO: 30.6 PG (ref 27–31)
MCHC RBC AUTO-ENTMCNC: 36.3 G/DL (ref 32–36)
MCV RBC AUTO: 84 FL (ref 82–98)
MONOCYTES # BLD AUTO: 1.4 K/UL (ref 0.3–1)
MONOCYTES NFR BLD: 13.8 % (ref 4–15)
NEUTROPHILS # BLD AUTO: 6.8 K/UL (ref 1.8–7.7)
NEUTROPHILS NFR BLD: 67.7 % (ref 38–73)
NRBC BLD-RTO: 0 /100 WBC
OHS QRS DURATION: 108 MS
OHS QRS DURATION: 112 MS
OHS QRS DURATION: 116 MS
OHS QRS DURATION: 116 MS
OHS QTC CALCULATION: 410 MS
OHS QTC CALCULATION: 413 MS
OHS QTC CALCULATION: 426 MS
OHS QTC CALCULATION: 428 MS
OHS QTC CALCULATION: 448 MS
OHS QTC CALCULATION: 457 MS
PLATELET # BLD AUTO: 150 K/UL (ref 150–450)
PMV BLD AUTO: 9.4 FL (ref 9.2–12.9)
RBC # BLD AUTO: 5.43 M/UL (ref 4.6–6.2)
TROPONIN I SERPL DL<=0.01 NG/ML-MCNC: 8.03 NG/ML (ref 0–0.03)
WBC # BLD AUTO: 9.97 K/UL (ref 3.9–12.7)

## 2025-03-14 PROCEDURE — 25000003 PHARM REV CODE 250: Performed by: EMERGENCY MEDICINE

## 2025-03-14 PROCEDURE — 63600175 PHARM REV CODE 636 W HCPCS: Performed by: EMERGENCY MEDICINE

## 2025-03-14 PROCEDURE — 84484 ASSAY OF TROPONIN QUANT: CPT | Performed by: EMERGENCY MEDICINE

## 2025-03-14 PROCEDURE — 96374 THER/PROPH/DIAG INJ IV PUSH: CPT | Mod: 59

## 2025-03-14 PROCEDURE — 85730 THROMBOPLASTIN TIME PARTIAL: CPT | Performed by: EMERGENCY MEDICINE

## 2025-03-14 PROCEDURE — 85025 COMPLETE CBC W/AUTO DIFF WBC: CPT | Performed by: EMERGENCY MEDICINE

## 2025-03-14 RX ORDER — ALUMINUM HYDROXIDE, MAGNESIUM HYDROXIDE, AND SIMETHICONE 1200; 120; 1200 MG/30ML; MG/30ML; MG/30ML
30 SUSPENSION ORAL
Status: COMPLETED | OUTPATIENT
Start: 2025-03-14 | End: 2025-03-14

## 2025-03-14 RX ORDER — MORPHINE SULFATE 2 MG/ML
4 INJECTION, SOLUTION INTRAMUSCULAR; INTRAVENOUS
Refills: 0 | Status: COMPLETED | OUTPATIENT
Start: 2025-03-14 | End: 2025-03-14

## 2025-03-14 RX ORDER — LIDOCAINE HYDROCHLORIDE 20 MG/ML
15 SOLUTION OROPHARYNGEAL
Status: COMPLETED | OUTPATIENT
Start: 2025-03-14 | End: 2025-03-14

## 2025-03-14 RX ADMIN — MORPHINE SULFATE 4 MG: 2 INJECTION, SOLUTION INTRAMUSCULAR; INTRAVENOUS at 01:03

## 2025-03-14 RX ADMIN — ALUMINUM HYDROXIDE, MAGNESIUM HYDROXIDE, AND DIMETHICONE 30 ML: 200; 20; 200 SUSPENSION ORAL at 12:03

## 2025-03-14 RX ADMIN — NITROGLYCERIN 0.5 INCH: 20 OINTMENT TOPICAL at 12:03

## 2025-03-14 RX ADMIN — LIDOCAINE HYDROCHLORIDE 15 ML: 20 SOLUTION ORAL at 12:03

## 2025-03-14 NOTE — ED NOTES
"Pt states, " Can I have the stuff they gave me earlier that numbed my throat. It help." MD notified.   "

## 2025-03-14 NOTE — ED NOTES
Pt engaged call light and reports sharp mid-sternal chest pain. Pain is a 6 on 0/10 pain scale. EKG repeated and reviewed by MD.

## 2025-03-14 NOTE — ED NOTES
"Rounding on pt. Pt reports sharp chest pain still present. Pain is a 6 on 0/10 pain scale. Pt reports HA that is an 8/10. Pt states, " I just feel terrible, I feel like I need fluids something. " MD notified.   "

## 2025-03-14 NOTE — ED NOTES
[facility] called with bed assignment. Pt accepted ED to ED to [facility]. Reporting number [id number]

## 2025-03-14 NOTE — PROVIDER TRANSFER
Outside Transfer Acceptance Note / Regional Referral Center    Referring facility: [facility]   Referring provider: PAULINA MENDIETA  Accepting facility: [facility]  Accepting provider: Dr Obrien  Admitting provider: Dr. Obrien   Reason for transfer: Higher Level of Care  Transfer diagnosis: NSTEMI  Transfer specialty requested: Interventional Cardiology  Transfer specialty notified: yes  Transfer level: NUMBER 1-5: 2  Bed type requested: med tele   Isolation status: No active isolations   Admission class or status: IP- Inpatient      Narrative     Pt is a 43 yo M with no known past medical history who initially presented to the ED with complaints of cough, coryza for the last few days as well as chest pain.  Patient reports his chest pain started just 1 hour before arrival to the ED.  The pain is substernal, pressure-like in quality.  6/10 in intensity.  The pain does not radiated but is associated with a cough, coryza, subjective fevers and headaches.  At home he has tried J[place] daytime cold and flu, NyQuil, Metamucil but has not found any relief.  While in the ED, patient noted to be afebrile, hemodynamically stable, saturating over 95% on room air.  Labs show WBC of 9.3, H/H 17/47, platelets 121.  INR 1.1, sodium 134, potassium 3.3, BUN/creatinine 12/1, AST/ALT 59/58, T bili 1.8.  BNP 21.  Flu negative, COVID negative, CXR nonacute.  Initial troponin on arrival 5.14. 2 hr recheck of troponin shows elevation to 7.97.  EKG x2 showed normal sinus rhythm with sinus arrhythmia, no ST elevations or depressions or T-wave changes.  Patient was given aspirin, nitroglycerin, started on a heparin drip.  Potassium was repleted. Dr Hernandez with interventional cards at [facility] agrees to consult with Hospital Medicine admitting.      Objective     Vitals: Temp: 98.1 °F (36.7 °C) (03/13/25 1919)  Pulse: 80 (03/13/25 1933)  Resp: 20 (03/13/25 1935)  BP: 133/86 (03/13/25 1933)  SpO2: 97 % (03/13/25  1934)    Recent Labs: All pertinent labs within the past 24 hours have been reviewed.  CBC:   Recent Labs   Lab 03/13/25  1404   WBC 9.39   HGB 17.2   HCT 47.7   *     CMP:   Recent Labs   Lab 03/13/25  1404   *   K 3.3*   CL 97   CO2 25      BUN 12   CREATININE 1.0   CALCIUM 9.4   PROT 8.2   ALBUMIN 4.0   BILITOT 1.8*   ALKPHOS 71   AST 59*   ALT 58*   ANIONGAP 12       Recent imaging:   Imaging Results              X-Ray Chest AP Portable (Final result)  Result time 03/13/25 13:43:47      Final result by Elda Ugarte MD (03/13/25 13:43:47)                   Impression:      No acute abnormality.      Electronically signed by: Elda Ugarte  Date:    03/13/2025  Time:    13:43               Narrative:    EXAMINATION:  XR CHEST AP PORTABLE    CLINICAL HISTORY:  Chest Pain;    TECHNIQUE:  Single frontal view of the chest was performed.    COMPARISON:  None    FINDINGS:  The lungs are clear, with normal appearance of pulmonary vasculature and no pleural effusion or pneumothorax.    The cardiac silhouette is normal in size. The hilar and mediastinal contours are unremarkable.    Bones are intact.                                      IV access:        Peripheral IV - Single Lumen 03/13/25 1328 20 G No Anterior;Distal;Left Upper Arm (Active)   Site Assessment Clean;Intact;Dry;No redness;No swelling 03/13/25 1409   Line Status Blood return noted 03/13/25 1409   Dressing Status Clean;Dry;Intact 03/13/25 1409   Dressing Intervention First dressing 03/13/25 1409   Reason Not Rotated Anticipated discharge 03/13/25 1409            Peripheral IV - Single Lumen 03/13/25 1916 20 G Posterior;Right Wrist (Active)   Site Assessment Clean;Dry;Intact 03/13/25 1920   Line Status Blood return noted;Flushed 03/13/25 1920   Dressing Status Clean;Dry;Intact 03/13/25 1920       Allergies: Review of patient's allergies indicates:  No Known Allergies     NPO: No    Anticoagulation:   Anticoagulants        Ordered     Route Frequency Start Stop    03/13/25 1544  heparin (PORCINE)  (LOW INTENSITY heparin infusion nomogram - [facility] (Recommended Indications: Acute Coronary Syndrome, Atrial Fibrillation, Prophylaxis in Prosthetic Heart Valves, and other indication where full anticoagulation is desired, bleeding risk is moderate, antiplatelet agents have been utilized, and time to therapeutic can be delayed minimizing the increased bleeding risk))         IV As needed (PRN) 03/13/25 1642 --    03/13/25 1544  heparin (PORCINE)  (LOW INTENSITY heparin infusion nomogram - [facility] (Recommended Indications: Acute Coronary Syndrome, Atrial Fibrillation, Prophylaxis in Prosthetic Heart Valves, and other indication where full anticoagulation is desired, bleeding risk is moderate, antiplatelet agents have been utilized, and time to therapeutic can be delayed minimizing the increased bleeding risk))         IV As needed (PRN) 03/13/25 1642 --    03/13/25 1544  heparin (porcine) in D5W  (LOW INTENSITY heparin infusion nomogram - [facility] (Recommended Indications: Acute Coronary Syndrome, Atrial Fibrillation, Prophylaxis in Prosthetic Heart Valves, and other indication where full anticoagulation is desired, bleeding risk is moderate, antiplatelet agents have been utilized, and time to therapeutic can be delayed minimizing the increased bleeding risk))         IV Continuous 03/13/25 1545 --             Instructions      [facility]  Admit to Hospital Medicine  Upon patient arrival to floor, please contact Hospital Medicine on call.

## 2025-03-14 NOTE — RESPIRATORY THERAPY
03/13/25 1908   Patient Assessment/Suction   Level of Consciousness (AVPU) alert   Respiratory Effort Normal;Unlabored   Expansion/Accessory Muscles/Retractions no retractions;no use of accessory muscles   Rhythm/Pattern, Respiratory depth regular;pattern regular;unlabored   Cough Frequency no cough   PRE-TX-O2   Device (Oxygen Therapy) room air   SpO2 96 %   Pulse Oximetry Type Continuous   $ Pulse Oximetry - Multiple Charge Pulse Oximetry - Multiple   Pulse 69   Resp 11

## 2025-03-14 NOTE — ED NOTES
Pt engaged call light and reports wanting to go to sleep. Pt states he has a cpap at night. Pt placed on 2L via [place].

## 2025-08-11 ENCOUNTER — HOSPITAL ENCOUNTER (OUTPATIENT)
Dept: RADIOLOGY | Facility: HOSPITAL | Age: 45
Discharge: HOME | End: 2025-08-11
Attending: FAMILY MEDICINE
Payer: COMMERCIAL

## 2025-08-11 DIAGNOSIS — R10.31 RLQ ABDOMINAL PAIN: ICD-10-CM

## 2025-08-11 PROCEDURE — A9698 NON-RAD CONTRAST MATERIALNOC: HCPCS

## 2025-08-11 PROCEDURE — 25500020 PHARM REV CODE 255

## 2025-08-11 RX ADMIN — IOHEXOL 100 ML: 350 INJECTION, SOLUTION INTRAVENOUS at 04:08

## 2025-08-11 RX ADMIN — IOHEXOL 1000 ML: 9 SOLUTION ORAL at 04:08

## 2025-09-02 ENCOUNTER — HOSPITAL ENCOUNTER (OUTPATIENT)
Dept: RADIOLOGY | Facility: HOSPITAL | Age: 45
Discharge: HOME | End: 2025-09-02
Attending: FAMILY MEDICINE
Payer: COMMERCIAL

## 2025-09-02 DIAGNOSIS — R16.1 SPLEEN ENLARGED: ICD-10-CM

## 2025-09-02 PROCEDURE — 76705 ECHO EXAM OF ABDOMEN: CPT | Mod: TC

## 2025-09-02 PROCEDURE — 76705 ECHO EXAM OF ABDOMEN: CPT | Mod: 26,,, | Performed by: RADIOLOGY
